# Patient Record
Sex: MALE | Race: WHITE | NOT HISPANIC OR LATINO | Employment: FULL TIME | URBAN - METROPOLITAN AREA
[De-identification: names, ages, dates, MRNs, and addresses within clinical notes are randomized per-mention and may not be internally consistent; named-entity substitution may affect disease eponyms.]

---

## 2019-02-26 ENCOUNTER — TELEPHONE (OUTPATIENT)
Dept: OTHER | Facility: CLINIC | Age: 29
End: 2019-02-26

## 2022-06-23 ENCOUNTER — OFFICE VISIT (OUTPATIENT)
Dept: FAMILY MEDICINE | Facility: CLINIC | Age: 32
End: 2022-06-23
Payer: COMMERCIAL

## 2022-06-23 VITALS
HEART RATE: 66 BPM | DIASTOLIC BLOOD PRESSURE: 77 MMHG | SYSTOLIC BLOOD PRESSURE: 115 MMHG | OXYGEN SATURATION: 100 % | TEMPERATURE: 98.4 F

## 2022-06-23 DIAGNOSIS — Z13.6 CARDIOVASCULAR SCREENING; LDL GOAL LESS THAN 100: ICD-10-CM

## 2022-06-23 DIAGNOSIS — Z00.00 ROUTINE GENERAL MEDICAL EXAMINATION AT A HEALTH CARE FACILITY: Primary | ICD-10-CM

## 2022-06-23 DIAGNOSIS — R20.0 HAND NUMBNESS: ICD-10-CM

## 2022-06-23 DIAGNOSIS — Z13.29 SCREENING FOR THYROID DISORDER: ICD-10-CM

## 2022-06-23 PROCEDURE — 99213 OFFICE O/P EST LOW 20 MIN: CPT | Mod: 25 | Performed by: NURSE PRACTITIONER

## 2022-06-23 PROCEDURE — 90471 IMMUNIZATION ADMIN: CPT | Performed by: NURSE PRACTITIONER

## 2022-06-23 PROCEDURE — 99395 PREV VISIT EST AGE 18-39: CPT | Mod: 25 | Performed by: NURSE PRACTITIONER

## 2022-06-23 PROCEDURE — 90746 HEPB VACCINE 3 DOSE ADULT IM: CPT | Performed by: NURSE PRACTITIONER

## 2022-06-23 ASSESSMENT — ENCOUNTER SYMPTOMS
DIARRHEA: 0
HEMATURIA: 0
PALPITATIONS: 0
JOINT SWELLING: 0
MYALGIAS: 0
SORE THROAT: 0
HEARTBURN: 0
HEADACHES: 0
ABDOMINAL PAIN: 0
DYSURIA: 0
ARTHRALGIAS: 0
CONSTIPATION: 0
EYE PAIN: 0
PARESTHESIAS: 0
HEMATOCHEZIA: 0
COUGH: 0
DIZZINESS: 0
NERVOUS/ANXIOUS: 0
FEVER: 0
NAUSEA: 0
CHILLS: 0
FREQUENCY: 0
WEAKNESS: 0
SHORTNESS OF BREATH: 0

## 2022-06-23 NOTE — NURSING NOTE
Prior to immunization administration, verified patients identity using patient s name and date of birth. Please see Immunization Activity for additional information.     Screening Questionnaire for Adult Immunization    Are you sick today?   No   Do you have allergies to medications, food, a vaccine component or latex?   No   Have you ever had a serious reaction after receiving a vaccination?   No   Do you have a long-term health problem with heart, lung, kidney, or metabolic disease (e.g., diabetes), asthma, a blood disorder, no spleen, complement component deficiency, a cochlear implant, or a spinal fluid leak?  Are you on long-term aspirin therapy?   No   Do you have cancer, leukemia, HIV/AIDS, or any other immune system problem?   No   Do you have a parent, brother, or sister with an immune system problem?   No   In the past 3 months, have you taken medications that affect  your immune system, such as prednisone, other steroids, or anticancer drugs; drugs for the treatment of rheumatoid arthritis, Crohn s disease, or psoriasis; or have you had radiation treatments?   No   Have you had a seizure, or a brain or other nervous system problem?   No   During the past year, have you received a transfusion of blood or blood    products, or been given immune (gamma) globulin or antiviral drug?   No   For women: Are you pregnant or is there a chance you could become       pregnant during the next month?   No   Have you received any vaccinations in the past 4 weeks?   No     Immunization questionnaire answers were all negative.        Per orders of Dr. Duran, injection of Hep B given by Milagro Seth MA. Patient instructed to remain in clinic for 15 minutes afterwards, and to report any adverse reaction to me immediately.       Screening performed by Milagro Seth MA on 6/23/2022 at 3:53 PM.

## 2022-06-23 NOTE — PROGRESS NOTES
SUBJECTIVE:   CC: Sb Fernandez is an 32 year old male who presents for preventative health visit.       Patient has been advised of split billing requirements and indicates understanding: Yes  Healthy Habits:     Getting at least 3 servings of Calcium per day:  Yes    Bi-annual eye exam:  NO    Dental care twice a year:  Yes    Sleep apnea or symptoms of sleep apnea:  None    Diet:  Regular (no restrictions)    Frequency of exercise:  4-5 days/week    Duration of exercise:  30-45 minutes    Taking medications regularly:  Yes    Medication side effects:  Not applicable    PHQ-2 Total Score: 0    Additional concerns today:  No           PROBLEMS TO ADD ON...  Previous right wrist fracture.  Healed with a cast.  Now with carrying an infant, gets sore.  Gets numbness in pinkie and hand, especially when holding infant daughter for long periods of time.       Today's PHQ-2 Score:   PHQ-2 ( 1999 Pfizer) 6/23/2022   Q1: Little interest or pleasure in doing things 0   Q2: Feeling down, depressed or hopeless 0   PHQ-2 Score 0   Q1: Little interest or pleasure in doing things Not at all   Q2: Feeling down, depressed or hopeless Not at all   PHQ-2 Score 0       Abuse: Current or Past(Physical, Sexual or Emotional)- No  Do you feel safe in your environment? YES    Have you ever done Advance Care Planning? (For example, a Health Directive, POLST, or a discussion with a medical provider or your loved ones about your wishes): No, advance care planning information given to patient to review.  Advanced care planning was discussed at today's visit.    Social History     Tobacco Use     Smoking status: Never Smoker     Smokeless tobacco: Never Used   Substance Use Topics     Alcohol use: Yes     Alcohol/week: 14.0 standard drinks     Types: 14 Cans of beer per week     Comment: 7+ a week     If you drink alcohol do you typically have >3 drinks per day or >7 drinks per week? Yes    Alcohol Use 6/23/2022   Prescreen: >3 drinks/day  or >7 drinks/week? Yes   Prescreen: >3 drinks/day or >7 drinks/week? -   AUDIT SCORE  5     AUDIT - Alcohol Use Disorders Identification Test - Reproduced from the World Health Organization Audit 2001 (Second Edition) 6/23/2022   1.  How often do you have a drink containing alcohol? 4 or more times a week   2.  How many drinks containing alcohol do you have on a typical day when you are drinking? 1 or 2   3.  How often do you have five or more drinks on one occasion? Less than monthly   4.  How often during the last year have you found that you were not able to stop drinking once you had started? Never   5.  How often during the last year have you failed to do what was normally expected of you because of drinking? Never   6.  How often during the last year have you needed a first drink in the morning to get yourself going after a heavy drinking session? Never   7.  How often during the last year have you had a feeling of guilt or remorse after drinking? Never   8.  How often during the last year have you been unable to remember what happened the night before because of your drinking? Never   9.  Have you or someone else been injured because of your drinking? No   10. Has a relative, friend, doctor or other health care worker been concerned about your drinking or suggested you cut down? No   TOTAL SCORE 5       Last PSA: No results found for: PSA    Reviewed orders with patient. Reviewed health maintenance and updated orders accordingly - Yes  Lab work is in process    Reviewed and updated as needed this visit by clinical staff   Tobacco  Allergies  Meds   Med Hx  Surg Hx  Fam Hx  Soc Hx          Reviewed and updated as needed this visit by Provider                       Review of Systems   Constitutional: Negative for chills and fever.   HENT: Negative for congestion, ear pain, hearing loss and sore throat.    Eyes: Negative for pain and visual disturbance.   Respiratory: Negative for cough and shortness of  breath.    Cardiovascular: Negative for chest pain, palpitations and peripheral edema.   Gastrointestinal: Negative for abdominal pain, constipation, diarrhea, heartburn, hematochezia and nausea.   Genitourinary: Negative for dysuria, frequency, genital sores, hematuria, impotence, penile discharge and urgency.   Musculoskeletal: Negative for arthralgias, joint swelling and myalgias.   Skin: Negative for rash.   Neurological: Negative for dizziness, weakness, headaches and paresthesias.   Psychiatric/Behavioral: Negative for mood changes. The patient is not nervous/anxious.          OBJECTIVE:   /77 (BP Location: Left arm, Patient Position: Sitting, Cuff Size: Adult Large)   Pulse 66   Temp 98.4  F (36.9  C)   SpO2 100%     Physical Exam  GENERAL: healthy, alert and no distress  EYES: Eyes grossly normal to inspection, PERRL and conjunctivae and sclerae normal  HENT: ear canals and TM's normal, nose and mouth without ulcers or lesions  NECK: no adenopathy, no asymmetry, masses, or scars and thyroid normal to palpation  RESP: lungs clear to auscultation - no rales, rhonchi or wheezes  CV: regular rate and rhythm, normal S1 S2, no S3 or S4, no murmur, click or rub, no peripheral edema and peripheral pulses strong  ABDOMEN: soft, nontender, no hepatosplenomegaly, no masses and bowel sounds normal   (male): normal male genitalia without lesions or urethral discharge, no hernia  MS: no gross musculoskeletal defects noted, no edema  SKIN: no suspicious lesions or rashes  NEURO: Normal strength and tone, mentation intact and speech normal  PSYCH: mentation appears normal, affect normal/bright        ASSESSMENT/PLAN:   (Z00.00) Routine general medical examination at a health care facility  (primary encounter diagnosis)  Comment: Reviewed medical history and health maintenance  Plan: Comprehensive metabolic panel (BMP + Alb, Alk         Phos, ALT, AST, Total. Bili, TP)          (R20.0) Hand numbness  Comment:   "Likely ulnar nerve entrapment from prolonged flexion while holding daughter.  Recommended avoiding prolong flexion, repetitive motions, and overuse.  Can also use NSAIDs and splinting.    If symptoms worse or persist, could consider hand therapy referral.      (Z13.6) CARDIOVASCULAR SCREENING; LDL GOAL LESS THAN 100  Comment:   Plan: Lipid panel reflex to direct LDL Fasting            (Z13.29) Screening for thyroid disorder  Comment:   Plan: TSH with free T4 reflex              Patient has been advised of split billing requirements and indicates understanding: Yes    COUNSELING:   Reviewed preventive health counseling, as reflected in patient instructions  Special attention given to:        Alcohol Use     Estimated body mass index is 24.05 kg/m  as calculated from the following:    Height as of 10/20/14: 1.791 m (5' 10.5\").    Weight as of 10/20/14: 77.1 kg (170 lb).         He reports that he has never smoked. He has never used smokeless tobacco.      Counseling Resources:  ATP IV Guidelines  Pooled Cohorts Equation Calculator  FRAX Risk Assessment  ICSI Preventive Guidelines  Dietary Guidelines for Americans, 2010  USDA's MyPlate  ASA Prophylaxis  Lung CA Screening    YVAN Villegas [facility]  "

## 2022-06-29 ENCOUNTER — LAB (OUTPATIENT)
Dept: LAB | Facility: CLINIC | Age: 32
End: 2022-06-29
Payer: COMMERCIAL

## 2022-06-29 DIAGNOSIS — Z00.00 ROUTINE GENERAL MEDICAL EXAMINATION AT A HEALTH CARE FACILITY: ICD-10-CM

## 2022-06-29 DIAGNOSIS — Z13.6 CARDIOVASCULAR SCREENING; LDL GOAL LESS THAN 100: ICD-10-CM

## 2022-06-29 DIAGNOSIS — Z13.29 SCREENING FOR THYROID DISORDER: ICD-10-CM

## 2022-06-29 PROCEDURE — 80053 COMPREHEN METABOLIC PANEL: CPT

## 2022-06-29 PROCEDURE — 84443 ASSAY THYROID STIM HORMONE: CPT

## 2022-06-29 PROCEDURE — 80061 LIPID PANEL: CPT

## 2022-06-29 PROCEDURE — 36415 COLL VENOUS BLD VENIPUNCTURE: CPT

## 2022-06-30 LAB
ALBUMIN SERPL-MCNC: 4.3 G/DL (ref 3.4–5)
ALP SERPL-CCNC: 66 U/L (ref 40–150)
ALT SERPL W P-5'-P-CCNC: 20 U/L (ref 0–70)
ANION GAP SERPL CALCULATED.3IONS-SCNC: 9 MMOL/L (ref 3–14)
AST SERPL W P-5'-P-CCNC: 13 U/L (ref 0–45)
BILIRUB SERPL-MCNC: 0.8 MG/DL (ref 0.2–1.3)
BUN SERPL-MCNC: 11 MG/DL (ref 7–30)
CALCIUM SERPL-MCNC: 8.6 MG/DL (ref 8.5–10.1)
CHLORIDE BLD-SCNC: 108 MMOL/L (ref 94–109)
CHOLEST SERPL-MCNC: 144 MG/DL
CO2 SERPL-SCNC: 24 MMOL/L (ref 20–32)
CREAT SERPL-MCNC: 0.91 MG/DL (ref 0.66–1.25)
FASTING STATUS PATIENT QL REPORTED: YES
GFR SERPL CREATININE-BSD FRML MDRD: >90 ML/MIN/1.73M2
GLUCOSE BLD-MCNC: 85 MG/DL (ref 70–99)
HDLC SERPL-MCNC: 54 MG/DL
LDLC SERPL CALC-MCNC: 80 MG/DL
NONHDLC SERPL-MCNC: 90 MG/DL
POTASSIUM BLD-SCNC: 4.3 MMOL/L (ref 3.4–5.3)
PROT SERPL-MCNC: 7.3 G/DL (ref 6.8–8.8)
SODIUM SERPL-SCNC: 141 MMOL/L (ref 133–144)
TRIGL SERPL-MCNC: 52 MG/DL
TSH SERPL DL<=0.005 MIU/L-ACNC: 2.17 MU/L (ref 0.4–4)

## 2022-09-03 ENCOUNTER — HEALTH MAINTENANCE LETTER (OUTPATIENT)
Age: 32
End: 2022-09-03

## 2022-11-12 ENCOUNTER — E-VISIT (OUTPATIENT)
Dept: URGENT CARE | Facility: CLINIC | Age: 32
End: 2022-11-12
Payer: COMMERCIAL

## 2022-11-12 DIAGNOSIS — H10.30 ACUTE CONJUNCTIVITIS, UNSPECIFIED ACUTE CONJUNCTIVITIS TYPE, UNSPECIFIED LATERALITY: Primary | ICD-10-CM

## 2022-11-12 PROCEDURE — 99421 OL DIG E/M SVC 5-10 MIN: CPT | Performed by: INTERNAL MEDICINE

## 2022-11-12 RX ORDER — POLYMYXIN B SULFATE AND TRIMETHOPRIM 1; 10000 MG/ML; [USP'U]/ML
2 SOLUTION OPHTHALMIC 4 TIMES DAILY
Qty: 10 ML | Refills: 0 | Status: SHIPPED | OUTPATIENT
Start: 2022-11-12 | End: 2022-11-19

## 2022-11-12 NOTE — PATIENT INSTRUCTIONS
Thank you for choosing us for your care. I have placed an order for a prescription so that you can start treatment. View your full visit summary for details by clicking on the link below. Your pharmacist will able to address any questions you may have about the medication.     If you re not feeling better within 2-3 days, please schedule an appointment.  You can schedule an appointment right here in [facility], or call [phone]      Conjunctivitis, Nonspecific    The membrane that covers the white part of your eye (the conjunctiva) is inflamed. Inflammation happens when your body responds to an injury, allergic reaction, infection, or illness. Symptoms of inflammation in the eye may include redness, irritation, itching, swelling, or burning. These symptoms should go away within the next 24 hours. Conjunctivitis may be related to a particle that was in your eye. If so, it may wash out with your tears or irrigation treatment. Being exposed to liquid chemicals or fumes may also cause this reaction.    Home care    Put a cold pack on the eye for 20 minutes at a time. This will reduce pain. To make a cold pack, put ice cubes in a plastic bag that seals at the top. Wrap the bag in a clean, thin towel or cloth.    Artificial tears may be prescribed to reduce irritation or redness. These should be used 3 to 4 times a day.    You may use acetaminophen or ibuprofen to control pain, unless another medicine was prescribed. If you have chronic liver or kidney disease, talk with your healthcare provider before using these medicines. Also talk with your provider if you have ever had a stomach ulcer or gastrointestinal bleeding.    Follow-up care  Follow up with your healthcare provider, or as advised.  When to seek medical advice  Call your healthcare provider right away if any of these occur:    Eyelid swells more    Eye pain gets worse    Redness or drainage from the eye gets worse    Blurry vision gets worse or you have  increased sensitivity to light    Normal vision does not return within 24 to 48 hours  Rai last reviewed this educational content on 2/1/2020 2000-2021 [employer]. All rights reserved. This information is not intended as a substitute for professional medical care. Always follow your healthcare professional's instructions.

## 2023-05-24 ENCOUNTER — PATIENT OUTREACH (OUTPATIENT)
Dept: CARE COORDINATION | Facility: CLINIC | Age: 33
End: 2023-05-24
Payer: COMMERCIAL

## 2023-06-08 ENCOUNTER — PATIENT OUTREACH (OUTPATIENT)
Dept: CARE COORDINATION | Facility: CLINIC | Age: 33
End: 2023-06-08
Payer: COMMERCIAL

## 2023-09-14 ENCOUNTER — OFFICE VISIT (OUTPATIENT)
Dept: FAMILY MEDICINE | Facility: CLINIC | Age: 33
End: 2023-09-14
Payer: COMMERCIAL

## 2023-09-14 VITALS
DIASTOLIC BLOOD PRESSURE: 72 MMHG | WEIGHT: 183.2 LBS | TEMPERATURE: 97.3 F | HEART RATE: 53 BPM | SYSTOLIC BLOOD PRESSURE: 113 MMHG | BODY MASS INDEX: 25.65 KG/M2 | RESPIRATION RATE: 16 BRPM | OXYGEN SATURATION: 100 % | HEIGHT: 71 IN

## 2023-09-14 DIAGNOSIS — Z13.220 LIPID SCREENING: ICD-10-CM

## 2023-09-14 DIAGNOSIS — D64.9 ANEMIA, UNSPECIFIED TYPE: ICD-10-CM

## 2023-09-14 DIAGNOSIS — Z13.1 SCREENING FOR DIABETES MELLITUS: ICD-10-CM

## 2023-09-14 DIAGNOSIS — Z00.00 ROUTINE GENERAL MEDICAL EXAMINATION AT A HEALTH CARE FACILITY: Primary | ICD-10-CM

## 2023-09-14 DIAGNOSIS — Z13.0 SCREENING FOR IRON DEFICIENCY ANEMIA: ICD-10-CM

## 2023-09-14 LAB
ALBUMIN SERPL BCG-MCNC: 4.5 G/DL (ref 3.5–5.2)
ALP SERPL-CCNC: 67 U/L (ref 40–129)
ALT SERPL W P-5'-P-CCNC: 17 U/L (ref 0–70)
ANION GAP SERPL CALCULATED.3IONS-SCNC: 9 MMOL/L (ref 7–15)
AST SERPL W P-5'-P-CCNC: 21 U/L (ref 0–45)
BILIRUB SERPL-MCNC: 0.8 MG/DL
BUN SERPL-MCNC: 12.6 MG/DL (ref 6–20)
CALCIUM SERPL-MCNC: 9.5 MG/DL (ref 8.6–10)
CHLORIDE SERPL-SCNC: 103 MMOL/L (ref 98–107)
CHOLEST SERPL-MCNC: 163 MG/DL
CREAT SERPL-MCNC: 0.85 MG/DL (ref 0.67–1.17)
DEPRECATED HCO3 PLAS-SCNC: 27 MMOL/L (ref 22–29)
EGFRCR SERPLBLD CKD-EPI 2021: >90 ML/MIN/1.73M2
ERYTHROCYTE [DISTWIDTH] IN BLOOD BY AUTOMATED COUNT: 13.5 % (ref 10–15)
GLUCOSE SERPL-MCNC: 87 MG/DL (ref 70–99)
HCT VFR BLD AUTO: 36.9 % (ref 40–53)
HDLC SERPL-MCNC: 47 MG/DL
HGB BLD-MCNC: 11.9 G/DL (ref 13.3–17.7)
LDLC SERPL CALC-MCNC: 99 MG/DL
MCH RBC QN AUTO: 27.5 PG (ref 26.5–33)
MCHC RBC AUTO-ENTMCNC: 32.2 G/DL (ref 31.5–36.5)
MCV RBC AUTO: 85 FL (ref 78–100)
NONHDLC SERPL-MCNC: 116 MG/DL
PLATELET # BLD AUTO: 373 10E3/UL (ref 150–450)
POTASSIUM SERPL-SCNC: 4.9 MMOL/L (ref 3.4–5.3)
PROT SERPL-MCNC: 7.1 G/DL (ref 6.4–8.3)
RBC # BLD AUTO: 4.32 10E6/UL (ref 4.4–5.9)
SODIUM SERPL-SCNC: 139 MMOL/L (ref 136–145)
TRIGL SERPL-MCNC: 86 MG/DL
WBC # BLD AUTO: 3.9 10E3/UL (ref 4–11)

## 2023-09-14 PROCEDURE — 90471 IMMUNIZATION ADMIN: CPT | Performed by: NURSE PRACTITIONER

## 2023-09-14 PROCEDURE — 36415 COLL VENOUS BLD VENIPUNCTURE: CPT | Performed by: NURSE PRACTITIONER

## 2023-09-14 PROCEDURE — 80061 LIPID PANEL: CPT | Performed by: NURSE PRACTITIONER

## 2023-09-14 PROCEDURE — 90636 HEP A/HEP B VACC ADULT IM: CPT | Performed by: NURSE PRACTITIONER

## 2023-09-14 PROCEDURE — 99395 PREV VISIT EST AGE 18-39: CPT | Mod: 25 | Performed by: NURSE PRACTITIONER

## 2023-09-14 PROCEDURE — 80053 COMPREHEN METABOLIC PANEL: CPT | Performed by: NURSE PRACTITIONER

## 2023-09-14 PROCEDURE — 90472 IMMUNIZATION ADMIN EACH ADD: CPT | Performed by: NURSE PRACTITIONER

## 2023-09-14 PROCEDURE — 90686 IIV4 VACC NO PRSV 0.5 ML IM: CPT | Performed by: NURSE PRACTITIONER

## 2023-09-14 PROCEDURE — 85027 COMPLETE CBC AUTOMATED: CPT | Performed by: NURSE PRACTITIONER

## 2023-09-14 ASSESSMENT — PAIN SCALES - GENERAL: PAINLEVEL: NO PAIN (0)

## 2023-09-14 ASSESSMENT — ENCOUNTER SYMPTOMS
DIARRHEA: 0
SORE THROAT: 0
PARESTHESIAS: 0
NAUSEA: 0
ABDOMINAL PAIN: 0
CHILLS: 0
FREQUENCY: 0
WEAKNESS: 0
DYSURIA: 0
HEMATURIA: 0
PALPITATIONS: 0
NERVOUS/ANXIOUS: 0
HEMATOCHEZIA: 0
DIZZINESS: 0
HEADACHES: 0
FEVER: 0
CONSTIPATION: 0
JOINT SWELLING: 0
ARTHRALGIAS: 0
EYE PAIN: 0
SHORTNESS OF BREATH: 0
COUGH: 0
MYALGIAS: 0
HEARTBURN: 0

## 2023-09-14 NOTE — NURSING NOTE
Prior to immunization administration, verified patients identity using patient s name and date of birth. Please see Immunization Activity for additional information.     Screening Questionnaire for Adult Immunization    Are you sick today?   No   Do you have allergies to medications, food, a vaccine component or latex?   No   Have you ever had a serious reaction after receiving a vaccination?   No   Do you have a long-term health problem with heart, lung, kidney, or metabolic disease (e.g., diabetes), asthma, a blood disorder, no spleen, complement component deficiency, a cochlear implant, or a spinal fluid leak?  Are you on long-term aspirin therapy?   No   Do you have cancer, leukemia, HIV/AIDS, or any other immune system problem?   No   Do you have a parent, brother, or sister with an immune system problem?   No   In the past 3 months, have you taken medications that affect  your immune system, such as prednisone, other steroids, or anticancer drugs; drugs for the treatment of rheumatoid arthritis, Crohn s disease, or psoriasis; or have you had radiation treatments?   No   Have you had a seizure, or a brain or other nervous system problem?   No   During the past year, have you received a transfusion of blood or blood    products, or been given immune (gamma) globulin or antiviral drug?   No   For women: Are you pregnant or is there a chance you could become       pregnant during the next month?   No   Have you received any vaccinations in the past 4 weeks?   No     Immunization questionnaire answers were all negative.      Patient instructed to remain in clinic for 15 minutes afterwards, and to report any adverse reactions.     Screening performed by Raheel Dawson MA on 9/14/2023 at 8:47 AM.

## 2023-09-14 NOTE — PROGRESS NOTES
SUBJECTIVE:   CC: Sb is an 33 year old who presents for preventative health visit.       9/14/2023     8:09 AM   Additional Questions   Roomed by Alexus loya   Accompanied by self       Healthy Habits:     Getting at least 3 servings of Calcium per day:  Yes    Bi-annual eye exam:  NO    Dental care twice a year:  Yes    Sleep apnea or symptoms of sleep apnea:  None    Diet:  Regular (no restrictions)    Frequency of exercise:  6-7 days/week    Duration of exercise:  30-45 minutes    Taking medications regularly:  Yes    Medication side effects:  None    Additional concerns today:  No      Today's PHQ-2 Score:       9/14/2023     8:00 AM   PHQ-2 ( 1999 Pfizer)   Q1: Little interest or pleasure in doing things 0   Q2: Feeling down, depressed or hopeless 0   PHQ-2 Score 0   Q1: Little interest or pleasure in doing things Not at all   Q2: Feeling down, depressed or hopeless Not at all   PHQ-2 Score 0           Social History     Tobacco Use    Smoking status: Never    Smokeless tobacco: Never   Substance Use Topics    Alcohol use: Yes     Alcohol/week: 14.0 standard drinks of alcohol     Types: 14 Cans of beer per week     Comment: 7+ a week           9/14/2023     8:00 AM   Alcohol Use   Prescreen: >3 drinks/day or >7 drinks/week? Yes   AUDIT SCORE  5         9/14/2023     8:00 AM   AUDIT - Alcohol Use Disorders Identification Test - Reproduced from the World Health Organization Audit 2001 (Second Edition)   1.  How often do you have a drink containing alcohol? 4 or more times a week   2.  How many drinks containing alcohol do you have on a typical day when you are drinking? 1 or 2   3.  How often do you have five or more drinks on one occasion? Less than monthly   4.  How often during the last year have you found that you were not able to stop drinking once you had started? Never   5.  How often during the last year have you failed to do what was normally expected of you because of drinking? Never   6.  How often  during the last year have you needed a first drink in the morning to get yourself going after a heavy drinking session? Never   7.  How often during the last year have you had a feeling of guilt or remorse after drinking? Never   8.  How often during the last year have you been unable to remember what happened the night before because of your drinking? Never   9.  Have you or someone else been injured because of your drinking? No   10. Has a relative, friend, doctor or other health care worker been concerned about your drinking or suggested you cut down? No   TOTAL SCORE 5       Last PSA: No results found for: PSA    Reviewed orders with patient. Reviewed health maintenance and updated orders accordingly - Yes  Lab work is in process    Reviewed and updated as needed this visit by clinical staff   Tobacco  Allergies  Meds              Reviewed and updated as needed this visit by Provider                     Review of Systems   Constitutional:  Negative for chills and fever.   HENT:  Negative for congestion, ear pain, hearing loss and sore throat.    Eyes:  Negative for pain and visual disturbance.   Respiratory:  Negative for cough and shortness of breath.    Cardiovascular:  Negative for chest pain, palpitations and peripheral edema.   Gastrointestinal:  Negative for abdominal pain, constipation, diarrhea, heartburn, hematochezia and nausea.   Genitourinary:  Negative for dysuria, frequency, genital sores, hematuria, impotence, penile discharge and urgency.   Musculoskeletal:  Negative for arthralgias, joint swelling and myalgias.   Skin:  Negative for rash.   Neurological:  Negative for dizziness, weakness, headaches and paresthesias.   Psychiatric/Behavioral:  Negative for mood changes. The patient is not nervous/anxious.          OBJECTIVE:   /72 (BP Location: Right arm, Patient Position: Sitting, Cuff Size: Adult Regular)   Pulse 53   Temp 97.3  F (36.3  C) (Temporal)   Resp 16   Ht 1.803 m (5'  "11\")   Wt 83.1 kg (183 lb 3.2 oz)   SpO2 100%   BMI 25.55 kg/m      Physical Exam  GENERAL: healthy, alert and no distress  EYES: Eyes grossly normal to inspection, PERRL and conjunctivae and sclerae normal  HENT: ear canals and TM's normal, nose and mouth without ulcers or lesions  NECK: no adenopathy, no asymmetry, masses, or scars and thyroid normal to palpation  RESP: lungs clear to auscultation - no rales, rhonchi or wheezes  CV: regular rate and rhythm, normal S1 S2, no S3 or S4, no murmur, click or rub, no peripheral edema and peripheral pulses strong  ABDOMEN: soft, nontender, no hepatosplenomegaly, no masses and bowel sounds normal  MS: no gross musculoskeletal defects noted, no edema  SKIN: no suspicious lesions or rashes  NEURO: Normal strength and tone, mentation intact and speech normal  PSYCH: mentation appears normal, affect normal/bright        ASSESSMENT/PLAN:   (Z00.00) Routine general medical examination at a health care facility  (primary encounter diagnosis)  Comment: Reviewed medical/social/family history and health maintenance.  Flu shot and Twinrix today.  Plan:     (Z13.0) Screening for iron deficiency anemia  Comment:   Plan: CBC with platelets            (Z13.220) Lipid screening  Comment:   Plan: Lipid panel reflex to direct LDL Fasting            (Z13.1) Screening for diabetes mellitus  Comment:   Plan: Comprehensive metabolic panel (BMP + Alb, Alk         Phos, ALT, AST, Total. Bili, TP)            Patient has been advised of split billing requirements and indicates understanding: Yes      COUNSELING:   Reviewed preventive health counseling, as reflected in patient instructions      BMI:   Estimated body mass index is 25.55 kg/m  as calculated from the following:    Height as of this encounter: 1.803 m (5' 11\").    Weight as of this encounter: 83.1 kg (183 lb 3.2 oz).         He reports that he has never smoked. He has never used smokeless tobacco.            Nirmala Duran, APRN " ASHA  [facility]

## 2023-09-19 ENCOUNTER — MYC MEDICAL ADVICE (OUTPATIENT)
Dept: FAMILY MEDICINE | Facility: CLINIC | Age: 33
End: 2023-09-19
Payer: COMMERCIAL

## 2023-09-19 DIAGNOSIS — D64.9 ANEMIA, UNSPECIFIED TYPE: Primary | ICD-10-CM

## 2023-09-20 ENCOUNTER — LAB (OUTPATIENT)
Dept: LAB | Facility: CLINIC | Age: 33
End: 2023-09-20
Payer: COMMERCIAL

## 2023-09-20 DIAGNOSIS — D64.9 ANEMIA, UNSPECIFIED TYPE: ICD-10-CM

## 2023-09-20 NOTE — TELEPHONE ENCOUNTER
Nirmala: AMBROSIO pt response, can close encounter.    Thanks for the message. I have not noticed any bleeding. As for my iron levels, I have been a regular blood donor for years, and they need to check my iron every time. I've gotten rejected once or twice in the past for low levels, so I started taking a multivitamin with iron. I confess that I haven't been as diligent with that multivitamin in the recent past, so I will start taking that more seriously again. My iron levels have been fine (at least above 13.3) for the last few years.     I'll go to the lab tomorrow to get the test to hopefully rule some things out.    Loulou BARAJAS RN  [facility]

## 2023-09-22 PROCEDURE — 82274 ASSAY TEST FOR BLOOD FECAL: CPT

## 2023-09-24 LAB — HEMOCCULT STL QL IA: NEGATIVE

## 2023-12-22 ENCOUNTER — OFFICE VISIT (OUTPATIENT)
Dept: URGENT CARE | Facility: URGENT CARE | Age: 33
End: 2023-12-22
Payer: COMMERCIAL

## 2023-12-22 ENCOUNTER — NURSE TRIAGE (OUTPATIENT)
Dept: NURSING | Facility: CLINIC | Age: 33
End: 2023-12-22
Payer: COMMERCIAL

## 2023-12-22 VITALS
SYSTOLIC BLOOD PRESSURE: 136 MMHG | BODY MASS INDEX: 25.2 KG/M2 | TEMPERATURE: 100 F | OXYGEN SATURATION: 100 % | HEART RATE: 79 BPM | DIASTOLIC BLOOD PRESSURE: 80 MMHG | HEIGHT: 71 IN | WEIGHT: 180 LBS

## 2023-12-22 DIAGNOSIS — J03.90 TONSILLITIS: Primary | ICD-10-CM

## 2023-12-22 DIAGNOSIS — R13.10 ODYNOPHAGIA: ICD-10-CM

## 2023-12-22 LAB
DEPRECATED S PYO AG THROAT QL EIA: NEGATIVE
GROUP A STREP BY PCR: NOT DETECTED

## 2023-12-22 PROCEDURE — 87651 STREP A DNA AMP PROBE: CPT | Performed by: EMERGENCY MEDICINE

## 2023-12-22 PROCEDURE — 87635 SARS-COV-2 COVID-19 AMP PRB: CPT | Performed by: EMERGENCY MEDICINE

## 2023-12-22 PROCEDURE — 99213 OFFICE O/P EST LOW 20 MIN: CPT | Performed by: EMERGENCY MEDICINE

## 2023-12-22 RX ORDER — DEXAMETHASONE SODIUM PHOSPHATE 10 MG/ML
6 INJECTION INTRAMUSCULAR; INTRAVENOUS ONCE
Status: COMPLETED | OUTPATIENT
Start: 2023-12-22 | End: 2023-12-22

## 2023-12-22 RX ADMIN — DEXAMETHASONE SODIUM PHOSPHATE 6 MG: 10 INJECTION INTRAMUSCULAR; INTRAVENOUS at 14:04

## 2023-12-22 NOTE — PROGRESS NOTES
"Assessment & Plan     Diagnosis:    ICD-10-CM    1. Tonsillitis  J03.90 Streptococcus A Rapid Screen w/Reflex to PCR - Clinic Collect     Asymptomatic COVID-19 Virus (Coronavirus) by PCR Nasopharyngeal     Group A Streptococcus PCR Throat Swab     dexAMETHasone (DECADRON) injectable solution used ORALLY 6 mg      2. Odynophagia  R13.10 dexAMETHasone (DECADRON) injectable solution used ORALLY 6 mg            Medical Decision Making:  Sb Fernandez is a 33 year old male presented with sore throat and clinical evidence of tonsillitis.  The rapid strep test is negative, and formal culture has been set up in the lab. There is no clinical evidence of  peritonsillar abscess, retropharyngeal abscess, epiglottis, or Pal's angina. The etiology is most likely viral. COVID-19 PCR is pending at this time. I have recommended treatment with analgesics, and we will await formal culture results.  If the culture is positive, a provider will call the patient to initiate anti-microbial therapy. Go to the ER if increasing pain, change in voice, neck pain, vomiting, fever, or shortness of breath. Follow-up with primary physician if not improving in 3-5 days. All questions answered. Patient verbalized understanding and agreement with the plan.    Junaid Wu PA-C  [facility]    Subjective     Sb Fernandez is a 33 year old male who presents to clinic today for the following health issues:  Chief Complaint   Patient presents with    Urgent Care     Night sweats last night     Pharyngitis     X4 days of sore throat        HPI  Patient reports 4 days of sore throat; getting worse. Did start having night sweats and fevers last night. Notes pain with swallowing, but no difficulty. No cough, chest pain, shortness of breath, ear pain or other concerns.       Review of Systems    See HPI    Objective      Vitals: /80   Pulse 79   Temp 100  F (37.8  C) (Temporal)   Ht 1.803 m (5' 11\")   Wt 81.6 kg (180 " "lb)   SpO2 100%   BMI 25.10 kg/m    Resp: 16    Patient Vitals for the past 24 hrs:   BP Temp Temp src Pulse SpO2 Height Weight   12/22/23 1324 136/80 100  F (37.8  C) Temporal 79 100 % 1.803 m (5' 11\") 81.6 kg (180 lb)       Vital signs reviewed by: Junaid Wu PA-C    Physical Exam   Constitutional: Alert and active. No acute distress.  Non-toxic appearing.  HENT:   Right Ear: External ear normal. TM: gray/pale appearing  Left Ear: External ear normal. TM: gray/pale appearing.  Nose: Nose normal.    Eyes: Conjunctivae, EOM and lids are normal.   Mouth: Mucous membranes are moist. Posterior oropharynx is erythematous. Exudates present. Tonsils are symmetrically enlarged. Uvula is midline. No submandibular swelling or erythema.  Neck: Normal ROM. No meningismus. Anterior cervical lymphadenopathy noted on the right.  Cardiovascular: Regular rate and rhythm  Pulmonary/Chest: Effort normal. No respiratory distress. Lungs are clear to auscultation throughout  Skin: No rash noted on visualized skin.       Labs/Imaging:  Results for orders placed or performed in visit on 12/22/23   Streptococcus A Rapid Screen w/Reflex to PCR - Clinic Collect     Status: Normal    Specimen: Throat; Swab   Result Value Ref Range    Group A Strep antigen Negative Negative       Interventions:  Decadron 6mg PO      Junaid Wu PA-C, December 22, 2023                        "

## 2023-12-22 NOTE — TELEPHONE ENCOUNTER
Pt is phoning stating that he is having some cold symptoms for the past few days     Pt is having a sore throat  -  Rates pain 4/10    Pt completed an at home COVID test yesterday evening 12/21/2023 which resulted negative     Pt was having some chills during the night     No fever currently     Per disposition: See in Office Today     Pt transferred to scheduling and if no appointments available, pt will go to [facility] for evaluation     Care advice given per protocol and when to call back. Pt verbalized understanding and agrees to plan of care.    Mary Tucker RN  [facility] Nurse Advisor  10:01 AM 12/22/2023      Reason for Disposition   Patient wants to be seen    Additional Information   Negative: SEVERE difficulty breathing (e.g., struggling for each breath, speaks in single words)   Negative: Sounds like a life-threatening emergency to the triager   Negative: Throat culture results, call about   Negative: Productive cough is main symptom   Negative: Runny nose is main symptom   Negative: Drooling or spitting out saliva (because can't swallow)   Negative: Unable to open mouth completely   Negative: Drinking very little and has signs of dehydration (e.g., no urine > 12 hours, very dry mouth, very lightheaded)   Negative: Patient sounds very sick or weak to the triager   Negative: Difficulty breathing (per caller) but not severe   Negative: Fever > 103 F (39.4 C)   Negative: Refuses to drink anything for > 12 hours   Negative: SEVERE sore throat pain   Negative: Pus on tonsils (back of throat) and swollen neck lymph nodes ('glands')   Negative: Earache also present   Negative: Widespread rash (especially chest and abdomen)   Negative: Diabetes mellitus or weak immune system (e.g., HIV positive, cancer chemo, splenectomy, organ transplant, chronic steroids)   Negative: History of rheumatic fever    Protocols used: Sore Throat-A-OH

## 2023-12-23 LAB — SARS-COV-2 RNA RESP QL NAA+PROBE: NEGATIVE

## 2024-11-23 ENCOUNTER — HEALTH MAINTENANCE LETTER (OUTPATIENT)
Age: 34
End: 2024-11-23